# Patient Record
Sex: FEMALE | Race: WHITE | NOT HISPANIC OR LATINO | URBAN - METROPOLITAN AREA
[De-identification: names, ages, dates, MRNs, and addresses within clinical notes are randomized per-mention and may not be internally consistent; named-entity substitution may affect disease eponyms.]

---

## 2017-12-23 ENCOUNTER — EMERGENCY (EMERGENCY)
Facility: HOSPITAL | Age: 67
LOS: 1 days | Discharge: HOME | End: 2017-12-23
Attending: EMERGENCY MEDICINE | Admitting: EMERGENCY MEDICINE
Payer: COMMERCIAL

## 2017-12-23 VITALS
OXYGEN SATURATION: 99 % | SYSTOLIC BLOOD PRESSURE: 121 MMHG | HEART RATE: 65 BPM | DIASTOLIC BLOOD PRESSURE: 70 MMHG | RESPIRATION RATE: 18 BRPM

## 2017-12-23 DIAGNOSIS — T78.1XXA OTHER ADVERSE FOOD REACTIONS, NOT ELSEWHERE CLASSIFIED, INITIAL ENCOUNTER: ICD-10-CM
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
## 2017-12-23 DIAGNOSIS — Z88.2 ALLERGY STATUS TO SULFONAMIDES: ICD-10-CM
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
## 2017-12-23 PROCEDURE — 99283 EMERGENCY DEPT VISIT LOW MDM: CPT

## 2017-12-23 PROCEDURE — 99283 EMERGENCY DEPT VISIT LOW MDM: CPT | Mod: 25

## 2017-12-23 RX ORDER — DIPHENHYDRAMINE HCL 50 MG
25 CAPSULE ORAL ONCE
Qty: 0 | Refills: 0 | Status: COMPLETED | OUTPATIENT
Start: 2017-12-23 | End: 2017-12-23

## 2017-12-23 RX ORDER — DIPHENHYDRAMINE HCL 50 MG
1 CAPSULE ORAL
Qty: 9 | Refills: 0 | OUTPATIENT
Start: 2017-12-23 | End: 2017-12-25

## 2017-12-23 RX ORDER — DEXAMETHASONE 0.5 MG/5ML
8 ELIXIR ORAL ONCE
Qty: 0 | Refills: 0 | Status: COMPLETED | OUTPATIENT
Start: 2017-12-23 | End: 2017-12-23

## 2017-12-23 RX ADMIN — Medication 25 MILLIGRAM(S): at 06:16

## 2017-12-23 RX ADMIN — Medication 8 MILLIGRAM(S): at 06:16

## 2017-12-23 NOTE — ED PROVIDER NOTE - PROGRESS NOTE DETAILS
pt feeling better, rash improved, recommend PMD f/u  I have discussed the discharge plan with the patient. The patient agrees with the plan, as discussed.  The patient understands Emergency Department diagnosis is a preliminary diagnosis often based on limited information and that the patient must adhere to the follow-up plan as discussed.  The patient understands that if the symptoms worsen or if prescribed medications do not have the desired/planned effect that the patient may return to the Emergency Department at any time for further evaluation and treatment.

## 2017-12-23 NOTE — ED PROVIDER NOTE - ENMT, MLM
Airway patent, Nasal mucosa clear. Mouth with normal mucosa. Throat has no vesicles, no oropharyngeal exudates and uvula is midline. no stridor, no trismus, no oral/tongue swelling, no drooling

## 2017-12-23 NOTE — ED PROVIDER NOTE - MEDICAL DECISION MAKING DETAILS
itchy, red rash to body, no airway compromise, no wheezing, likely allergic reaction  -decadron, benadryl

## 2017-12-23 NOTE — ED ADULT TRIAGE NOTE - CHIEF COMPLAINT QUOTE
I think I am having an allergic reaction.  I don't know to what, but my face got very numb and my body got very warm and red.

## 2017-12-23 NOTE — ED PROVIDER NOTE - OBJECTIVE STATEMENT
67F no PMH c/o allergic reaction. pt states she woke up this morning, had coffee and ate usual protein bar for breakfast. states then she developed warmth, swelling, and tightness to face.  spread to body, redness, itching to extremities and torso.  no fevers. no vomiting. no intraoral swelling.  no sick contacts. no recent travel.  no new foods, no new meds, no new soaps/detergents/lotions.  pt states the coffee pot was new.

## 2019-08-28 ENCOUNTER — APPOINTMENT (OUTPATIENT)
Dept: OTOLARYNGOLOGY | Facility: CLINIC | Age: 69
End: 2019-08-28
Payer: COMMERCIAL

## 2019-08-28 VITALS
HEART RATE: 82 BPM | SYSTOLIC BLOOD PRESSURE: 124 MMHG | BODY MASS INDEX: 23.27 KG/M2 | HEIGHT: 63.5 IN | DIASTOLIC BLOOD PRESSURE: 78 MMHG | WEIGHT: 133 LBS

## 2019-08-28 DIAGNOSIS — Z87.891 PERSONAL HISTORY OF NICOTINE DEPENDENCE: ICD-10-CM

## 2019-08-28 DIAGNOSIS — R51 HEADACHE: ICD-10-CM

## 2019-08-28 DIAGNOSIS — Z80.9 FAMILY HISTORY OF MALIGNANT NEOPLASM, UNSPECIFIED: ICD-10-CM

## 2019-08-28 DIAGNOSIS — Z82.49 FAMILY HISTORY OF ISCHEMIC HEART DISEASE AND OTHER DISEASES OF THE CIRCULATORY SYSTEM: ICD-10-CM

## 2019-08-28 DIAGNOSIS — Z87.09 PERSONAL HISTORY OF OTHER DISEASES OF THE RESPIRATORY SYSTEM: ICD-10-CM

## 2019-08-28 PROBLEM — Z00.00 ENCOUNTER FOR PREVENTIVE HEALTH EXAMINATION: Status: ACTIVE | Noted: 2019-08-28

## 2019-08-28 PROCEDURE — 99213 OFFICE O/P EST LOW 20 MIN: CPT | Mod: 25

## 2019-08-28 PROCEDURE — 31231 NASAL ENDOSCOPY DX: CPT

## 2019-08-28 RX ORDER — ZALEPLON 10 MG/1
CAPSULE ORAL
Refills: 0 | Status: ACTIVE | COMMUNITY

## 2019-08-28 RX ORDER — FINASTERIDE 1 MG/1
TABLET ORAL
Refills: 0 | Status: ACTIVE | COMMUNITY

## 2019-08-28 NOTE — ASSESSMENT
[FreeTextEntry1] : She has a 1.5 week history of mid  facial pain, sinus pressure, and headaches. There was no obvious signs of a infection on nasal endoscopy. The maxillary sinuses were clear. She has had reflux exacerbation. We discussed possible etiologies for her symptoms such as migraines, neuralgias, and TMJ dysfunction.\par \par Plan\par -Findings and management options were discussed with the patient.\par -Nasal saline irrigations and decongestants/antihistamines as needed\par -I recommended that she stop using the topical decongestant spray as it can cause chronic changes, is addictive and causes rebound symptoms. She was asked to start a nasal steroid spray\par -I asked her to consider trying the medication for her migraines.\par -Continue treatment for reflux. It is possible that the PPI could be causing the headaches. She may try switching to Zantac.\par -Dental evaluation to rule out dental source\par -She can consider CT scan of the sinuses. Her sinuses were clear on MRI of the brain last year but we can obtain the scan to rule out sinus disease\par -Followup in one week or call to me know how she is doing

## 2019-08-28 NOTE — HISTORY OF PRESENT ILLNESS
[de-identified] : JAY MONTAGUE is a 68 year patient with a history of chronic rhinosinusitis, migraines, and reflux. She has had mid facial pain and sinus pressure for 1.5 weeks. Initially she had nausea, heartburn, and reflux related symptoms 3 weeks ago. She spoke with her gastroenterologist and started taking omeprazole twice a day. The heartburn improved but the nausea did not change. She was at a barbecue 1.5 weeks ago. She did have some wine. She developed sinus pressure, midfacial pain and headaches. The symptoms are constant. She feels that she cannot think. She also has nasal congestion. She did not have a cold. She tried Afrin, Mucinex D and Sudafed without improvement. She saw the neurologist. She was given prednisone but it did not help. She was given a migraine medicine but has not yet tried it. She has been using Mucinex decongestant nasal spray which does help. She is not using a nasal steroid spray. She said that she had allergy evaluation since her last visit which was negative. She had an MRI last year which showed no significant sinus disease. She is undergoing treatment with Invisalign. She denies a history of TMJ dysfunction

## 2019-11-18 ENCOUNTER — APPOINTMENT (OUTPATIENT)
Dept: OTOLARYNGOLOGY | Facility: CLINIC | Age: 69
End: 2019-11-18
Payer: COMMERCIAL

## 2019-11-18 VITALS
HEIGHT: 63.5 IN | WEIGHT: 133 LBS | SYSTOLIC BLOOD PRESSURE: 102 MMHG | HEART RATE: 70 BPM | DIASTOLIC BLOOD PRESSURE: 55 MMHG | BODY MASS INDEX: 23.27 KG/M2

## 2019-11-18 PROCEDURE — 92557 COMPREHENSIVE HEARING TEST: CPT

## 2019-11-18 PROCEDURE — 99214 OFFICE O/P EST MOD 30 MIN: CPT | Mod: 25

## 2019-11-18 PROCEDURE — 92567 TYMPANOMETRY: CPT

## 2019-11-18 NOTE — HISTORY OF PRESENT ILLNESS
[de-identified] : JAY MONTAGUE has a history of ear pain. Patient reports of ear pain greater in the right ear than the left ear. Stabbing brief pain. The patient is using dental braces (invisilgn).\par \par Patient reports of severe tinnitus. She reports of being very congested with a lot of sinus pressure. Patient reports of no perceived changes in hearing. No otorrhea reported at this visit.

## 2019-11-18 NOTE — ASSESSMENT
[FreeTextEntry1] : Ear hygiene reviewed in detail.  Follow up recommended if symptoms persist or progresses.  Routine follow up for cerumen management suggested.\par \par I have carefully reviewed the etiologies of tinnitus with the patient and have reviewed management options including coping strategies.  I have offered a referral to an audiologist for further evaluation and counseling.\par \par Recent pain appears to be referred from the TMJ joint. Further evaluation recommended if symptoms persist a progress

## 2019-11-18 NOTE — DATA REVIEWED
[de-identified] : Complete audiometry was ordered and completed today. This was separately reported by the audiologist. The results were reviewed in detail with the patient.\par \par

## 2020-02-24 ENCOUNTER — APPOINTMENT (OUTPATIENT)
Dept: OTOLARYNGOLOGY | Facility: CLINIC | Age: 70
End: 2020-02-24
Payer: COMMERCIAL

## 2020-02-24 VITALS
HEIGHT: 65 IN | SYSTOLIC BLOOD PRESSURE: 110 MMHG | HEART RATE: 70 BPM | WEIGHT: 133 LBS | BODY MASS INDEX: 22.16 KG/M2 | DIASTOLIC BLOOD PRESSURE: 78 MMHG

## 2020-02-24 DIAGNOSIS — Z87.09 PERSONAL HISTORY OF OTHER DISEASES OF THE RESPIRATORY SYSTEM: ICD-10-CM

## 2020-02-24 PROCEDURE — 99213 OFFICE O/P EST LOW 20 MIN: CPT | Mod: 25

## 2020-02-24 PROCEDURE — 31231 NASAL ENDOSCOPY DX: CPT

## 2020-02-24 RX ORDER — OMEPRAZOLE 40 MG/1
40 CAPSULE, DELAYED RELEASE ORAL
Refills: 0 | Status: DISCONTINUED | COMMUNITY
End: 2020-02-24

## 2020-02-24 RX ORDER — CROMOLYN SODIUM 40 MG/ML
4 SOLUTION/ DROPS OPHTHALMIC
Qty: 10 | Refills: 0 | Status: ACTIVE | COMMUNITY
Start: 2020-01-21

## 2020-02-24 RX ORDER — METHYLPREDNISOLONE 4 MG/1
4 TABLET ORAL
Refills: 0 | Status: DISCONTINUED | COMMUNITY
End: 2020-02-24

## 2020-02-24 NOTE — HISTORY OF PRESENT ILLNESS
[de-identified] : Patient reports one week history of rhinitis, clear thin occasionally blood-tinged mucus and maxillary pressure and operative pressure. She has been taking Sudafed, Advil, humidifier, and Afrin  With no improvement. Previous MRI in [address] showed no sinus opacification. She has been seen by Dr. Spurling and Dr. Ramesh within the past year for headaches and attributed to TMJ arthralgia in the setting of invisalign\par \par

## 2020-02-24 NOTE — ASSESSMENT
[FreeTextEntry1] : Facial pain and rhinitis. I see no evidence of acute rhinosinusitis. We did send a culture from the left middle meatus. Recommended retractor results and initiate antibiotics if clinically warranted. Also recommended saline and Sudafed/afrin.  Followup 2 weeks if worse

## 2020-04-06 LAB — BACTERIA FLD CULT: ABNORMAL

## 2020-10-06 ENCOUNTER — APPOINTMENT (OUTPATIENT)
Dept: OTOLARYNGOLOGY | Facility: CLINIC | Age: 70
End: 2020-10-06
Payer: COMMERCIAL

## 2020-10-06 VITALS — WEIGHT: 133 LBS | HEIGHT: 65 IN | BODY MASS INDEX: 22.16 KG/M2 | TEMPERATURE: 97 F

## 2020-10-06 PROCEDURE — 99213 OFFICE O/P EST LOW 20 MIN: CPT | Mod: 25

## 2020-10-06 PROCEDURE — 31231 NASAL ENDOSCOPY DX: CPT

## 2020-10-06 RX ORDER — AZITHROMYCIN 250 MG/1
250 TABLET, FILM COATED ORAL
Qty: 6 | Refills: 0 | Status: COMPLETED | COMMUNITY
Start: 2019-12-24 | End: 2020-10-06

## 2020-10-06 NOTE — HISTORY OF PRESENT ILLNESS
[de-identified] : JAY MONTAGUE is here for a possible sinus infection. She has a history of chronic rhinitis, recurrent sinusitis, migraines, and reflux. She developed bilateral maxillary sinus pain about 1.5 weeks ago. She has also had nasal congestion and obstruction. She has no fever, cough, or loss of smell. She denies known COVID exposure. She uses a Neti pot, Flonase, and Sudafed. Afrin does help and she is used it a few times. She does use Invisalgn and may have TMJ dysfunction and pain from that. She also suffers from tinnitus. That has been better lately. She saw Dr. Jimenez in February for possible sinus infection. She does not think that she required antibiotics at the time., MRI in the past showed no significant sinus disease.

## 2020-10-06 NOTE — ASSESSMENT
[FreeTextEntry1] : She has had midfacial pain and nasal congestion for the past 1.5 weeks. There was a little stringy mucus on nasal endoscopy. A culture was taken. We discussed the possible causes such as sinus infection, allergies, migraines, and TMJ dysfunction. she said that she tested negative for allergies in the past\par \par Plan\par -Findings and management options were discussed with the patient.\par -Nasal saline irrigations and decongestants or antihistamines as needed\par -I asked her to only use Afrin for approximately 3 days if she needs to\par -flonase\par -trial of atrovent\par -continue management of migraines, reflux and possible TMJD\par -I will see how she is feeling when she calls on Friday. We'll discuss possibly treating her with antibiotics and steroids even if the culture is negative (if she is still symptomatic)  She will be flying next week. I recommended a decongestant and Afrin before the flight\par -I will see her back if her symptoms worsen or do not resolve in the next couple of weeks.  We had discussed obtaining a sinus CT scan in the past. I will speak with her about it again if the symptoms persist

## 2020-10-12 LAB — BACTERIA FLD CULT: NORMAL

## 2020-12-23 PROBLEM — Z87.09 HISTORY OF UPPER RESPIRATORY INFECTION: Status: RESOLVED | Noted: 2020-02-24 | Resolved: 2020-12-23

## 2021-08-11 ENCOUNTER — NON-APPOINTMENT (OUTPATIENT)
Age: 71
End: 2021-08-11

## 2021-08-12 ENCOUNTER — APPOINTMENT (OUTPATIENT)
Dept: ORTHOPEDIC SURGERY | Facility: CLINIC | Age: 71
End: 2021-08-12
Payer: COMMERCIAL

## 2021-08-12 ENCOUNTER — NON-APPOINTMENT (OUTPATIENT)
Age: 71
End: 2021-08-12

## 2021-08-12 VITALS
BODY MASS INDEX: 22.86 KG/M2 | WEIGHT: 129 LBS | DIASTOLIC BLOOD PRESSURE: 69 MMHG | HEIGHT: 63 IN | SYSTOLIC BLOOD PRESSURE: 112 MMHG | HEART RATE: 77 BPM

## 2021-08-12 PROCEDURE — 99204 OFFICE O/P NEW MOD 45 MIN: CPT

## 2021-08-12 PROCEDURE — 72100 X-RAY EXAM L-S SPINE 2/3 VWS: CPT

## 2021-08-31 ENCOUNTER — APPOINTMENT (OUTPATIENT)
Dept: OTOLARYNGOLOGY | Facility: CLINIC | Age: 71
End: 2021-08-31
Payer: COMMERCIAL

## 2021-08-31 DIAGNOSIS — H92.02 OTALGIA, LEFT EAR: ICD-10-CM

## 2021-08-31 PROCEDURE — 69210 REMOVE IMPACTED EAR WAX UNI: CPT

## 2021-08-31 PROCEDURE — 99212 OFFICE O/P EST SF 10 MIN: CPT | Mod: 25

## 2021-08-31 NOTE — ASSESSMENT
[FreeTextEntry1] : She has had left ear pain for the past couple of days. Cerumen was removed. The ear was otherwise normal. I discussed possible etiologies for ear pain.  We had discussed the possibility it could be related to TMJ dysfunction from the Invisalign. \par \par Plan\par -Findings and management options were discussed with the patient.\par -PLAN\par \par -findings and management options discussed in detail with the patient. \par -good aural hygiene\par -avoid using cotton swabs in the ears\par -wax removal drops as needed. \par -annual audiogram- she was unable to stay for one day\par -follow up if the ear pain persists or she has any other problems.  \par

## 2021-08-31 NOTE — HISTORY OF PRESENT ILLNESS
[de-identified] : JAY MONTAGUE is a 70 year patient With a history of bilateral hearing loss and recurrent cerumen impaction. She has had  pain in the left ear for 2-3 days. She has no otorrhea or dizziness. She has tinnitus but there has been no change. She has no nasal or throat symptoms. She does use Invisalign. That maybe altering her bite. We had discussed possible TMJD in the past\par \par ENT history\par MRI in the past showed no significant sinus disease

## 2021-09-23 ENCOUNTER — APPOINTMENT (OUTPATIENT)
Dept: ORTHOPEDIC SURGERY | Facility: CLINIC | Age: 71
End: 2021-09-23
Payer: COMMERCIAL

## 2021-09-23 DIAGNOSIS — M51.36 OTHER INTERVERTEBRAL DISC DEGENERATION, LUMBAR REGION: ICD-10-CM

## 2021-09-23 DIAGNOSIS — M54.16 RADICULOPATHY, LUMBAR REGION: ICD-10-CM

## 2021-09-23 PROCEDURE — 99213 OFFICE O/P EST LOW 20 MIN: CPT | Mod: 95

## 2021-09-23 NOTE — PHYSICAL EXAM
[Normal] : Gait: normal [de-identified] : 5 out of 5 motor strength, sensation is intact and symmetrical full range of motion flexion extension and rotation, no palpatory tenderness full range of motion of hips knees shoulders and elbows (all four extremities), no atrophy, negative straight leg raise, no pathological reflexes, no swelling, normal ambulation, no apparent distress skin is intact, no palpable lymph nodes, no upper or lower extremity instability, alert and oriented x3 and normal mood. Normal finger-to nose test. Incision healed. Numbness right S1. Pain over right troch area. [de-identified] : MRI Lumbar 9/21/21 ([facility]) - mild neural compression (report)-S/P Mild dextroscoliosis of lumbar.

## 2021-09-23 NOTE — DISCUSSION/SUMMARY
[de-identified] : Right  S1 radiculopathy.\par Mild right hip arthritis.\par L4 mild right neural compression.\par Discussed all options.\par PT, and NSAIDs.\par Medrol before NSAIDs.\par Discussed injection right trochanteric region. \par All questions were answered, all alternatives discussed and the patient is in complete agreement with that plan. Follow-up appointment as instructed. Any issues and the patient will call or come in sooner.

## 2021-09-23 NOTE — REASON FOR VISIT
[Home] : at home, [unfilled] , at the time of the visit. [Medical Office: ([facility])___] : at the medical office located in  [Verbal consent obtained from patient] : the patient, [unfilled] [Follow-Up Visit] : a follow-up visit for

## 2021-09-23 NOTE — HISTORY OF PRESENT ILLNESS
[Worsening] : worsening [de-identified] : 70 year old female referred by Dr. Emily Petersen presenting for follow up evaluation of intermittent lower back pain over the years. \par S/P lumbar surgery in 2003 at [facility]. \par Went for lumbar MRI. \par Medrol no help.\par Has complaints of R buttock pain that radiates to the groin to the anterior thigh.\par States to have seen Neurosurgeon previously. (Dr. Winter Bruno) Surgery.\par Sees Chiropractic care Dr. Emily Petersen.\par Complains of numbness radiating down the R leg to the 4th and 5th toes, which she has had intermittently over the past few years. States that numbness is more bothersome than the pain. \eugenia Takes advil and has intermittent relief. \par Has been participating with chiropractic care with Dr. Petersen over the years which she states helps. \par Denies epidurals. \eugenia Presents today for MRI Lumbar review. \par No fever chills sweats nausea vomiting no bowel or bladder dysfunction, no recent weight loss or gain no night pain. This history is in addition to the intake form that I personally reviewed. \par

## 2022-01-10 ENCOUNTER — APPOINTMENT (OUTPATIENT)
Dept: OTOLARYNGOLOGY | Facility: CLINIC | Age: 72
End: 2022-01-10
Payer: COMMERCIAL

## 2022-01-10 PROCEDURE — 92557 COMPREHENSIVE HEARING TEST: CPT

## 2022-01-10 PROCEDURE — 92567 TYMPANOMETRY: CPT

## 2022-01-10 PROCEDURE — 99213 OFFICE O/P EST LOW 20 MIN: CPT | Mod: 25

## 2022-01-10 RX ORDER — EVOLOCUMAB 140 MG/ML
140 INJECTION, SOLUTION SUBCUTANEOUS
Qty: 2 | Refills: 0 | Status: ACTIVE | COMMUNITY
Start: 2022-01-05

## 2022-01-10 NOTE — ASSESSMENT
[FreeTextEntry1] : She has had left-sided tinnitus for the past couple of weeks. It is intermittently worse. Her ear exam was normal. Audiogram showed a bilateral sensorineural hearing loss with mild asymmetry left ear which is stable. I discussed possible causes of exacerbation of the tinnitus. \par \par PLAN\par \par - findings and management options discussed with the patient. \par - Good aural hygiene.\par - noise precautions\par - repeat audiogram in one year\par - literature regarding tinnitus given\par - Avoid substances that can make tinnitus worse.  \par - She may try short burst of steroids. She will consider it \par - She may consider tinnitus  therapy \par - They may use a white noise maker or leave the tv/radio on when it is quiet \par - Other treatment options such as Elavil, tinnitus maskers, hearing aids were discussed.\par - follow up or call me in the next couple weeks if the tinnitus does not improve. \par - call and return earlier if any concerns

## 2022-01-10 NOTE — HISTORY OF PRESENT ILLNESS
[de-identified] : JAY MONTAGUE is a 71 year patient With a history of bilateral sensorineural hearing loss here for tinnitus.  She has had left-sided tinnitus for the past 2 weeks. It intermittently worsens. She has a highed pitch noise. She noticed it after she was seeing her chiropractor and was having her back manipulated. She did not have acoustic trauma. She has no otalgia or otorrhea. She does have mild nasal congestion and postnasal drip which is unchanged. She uses a nasal steroid spray. She did get her permanent retainers after using Invisalign. she feels that they are tight but she does not have ear pain. She does have some dental discomfort. The tinnitus was worse on Friday night. She tried a homeopathic medication Cortisolv which she thinks may have helped. It is a little bit worse today.\par \par ENT History \par She has a history of mild sensorineural hearing loss with a mild asymmetry in the left ear. \par She had an MRI in the past \par she may have TMJ dysfunction

## 2022-07-27 ENCOUNTER — APPOINTMENT (OUTPATIENT)
Dept: OTOLARYNGOLOGY | Facility: CLINIC | Age: 72
End: 2022-07-27

## 2022-07-27 VITALS — BODY MASS INDEX: 22.86 KG/M2 | HEIGHT: 63 IN | WEIGHT: 129 LBS

## 2022-07-27 PROCEDURE — 99213 OFFICE O/P EST LOW 20 MIN: CPT | Mod: 25

## 2022-07-27 PROCEDURE — 69210 REMOVE IMPACTED EAR WAX UNI: CPT

## 2022-07-27 NOTE — HISTORY OF PRESENT ILLNESS
[de-identified] : JAY MONTAGUE is a 71 year old patient who is well-known to me.  She is here to check for cerumen impaction.  She has had a little bit worsening of the tinnitus which is still worse on the left side.  She has no otalgia or otorrhea.  She has been seeing a chiropractor for TMJ dysfunction.  She has new retainers.  The ear pain has resolved.  She also has a history of a chronic postnasal drip and chronic rhinitis.  She uses an antihistamine with decongestant.\par \par ENT History \par She has a history of mild sensorineural hearing loss with a mild asymmetry in the left ear. \par She had an MRI in the past \par She has TMJ dysfunction.  She uses retainers following Invisalign treatment

## 2022-07-27 NOTE — ASSESSMENT
[FreeTextEntry1] : She has been doing well since her last visit.  She had cerumen impaction which was removed today.\par \par PLAN\par \par - findings and management options discussed with the patient. \par - continue good aural hygiene.\par -Annual audiogram\par -Continue treatment for TMJ dysfunction\par -Follow-up in a few months to check for cerumen if she is doing well. \par - call and return earlier if any concerns

## 2022-08-01 ENCOUNTER — APPOINTMENT (OUTPATIENT)
Dept: OTOLARYNGOLOGY | Facility: CLINIC | Age: 72
End: 2022-08-01

## 2022-09-23 ENCOUNTER — APPOINTMENT (OUTPATIENT)
Dept: OTOLARYNGOLOGY | Facility: CLINIC | Age: 72
End: 2022-09-23

## 2022-09-23 VITALS — BODY MASS INDEX: 23.21 KG/M2 | HEIGHT: 63 IN | TEMPERATURE: 94 F | WEIGHT: 131 LBS

## 2022-09-23 PROCEDURE — 99213 OFFICE O/P EST LOW 20 MIN: CPT | Mod: 25

## 2022-09-23 PROCEDURE — 31231 NASAL ENDOSCOPY DX: CPT

## 2022-09-23 NOTE — ASSESSMENT
[FreeTextEntry1] : She has had nasal congestion and sinus pain/pressure.  On exam, there is no obvious sinus infection.  Her pain is mainly over the maxillary sinuses and the sinuses were clear.  Both sinuses have been extensively also notable directly visualize the sinus the pain may be due to her TMJ dysfunction.  She also may have migraines.  The nasal congestion may be due to allergies.  She has a history of reflux but has not had many symptoms\par \par Plan\par -Findings and management options were discussed with the patient.\par -Nasal saline irrigations and antihistamine plus minus decongestant as needed\par -I recommend nasal steroid spray\par -She may use Breathe Right strips\par -Management of TMJ dysfunction recommended\par -She may try a burst of steroids.  She has taken prednisone in the past.  I would hold off on antibiotics as there is no obvious sinus infection on nasal endoscopy\par -Consider CT scan of the sinuses if her symptoms persist\par -I have asked her to call me next week and let me know how she is feeling.  If she is not feeling better, we will discuss further management

## 2022-09-23 NOTE — HISTORY OF PRESENT ILLNESS
[de-identified] : JAY MONTAGUE is a 71 year old patient here for acute history of nasal congestion, nasal obstruction, and facial pain over the maxillary sinuses.  She flew recently.  She did not notice any worsening of the symptoms in the flight.  She used Afrin for the past several days.  The facial pain is somewhat better today.  She is not using a nasal steroid spray.  She is an antihistamine and decongestant as needed.  She does have TMJ dysfunction.  She sees a chiropractor for it\par  \par ENT History \par She has a history of mild sensorineural hearing loss with a mild asymmetry in the left ear. \par She had an MRI in the past.  No IAC masses.  Sinuses reportedly clear\par She has TMJ dysfunction. She uses retainers following Invisalign treatment\par she has seen a neurologist in the past

## 2023-03-30 ENCOUNTER — APPOINTMENT (OUTPATIENT)
Dept: OTOLARYNGOLOGY | Facility: CLINIC | Age: 73
End: 2023-03-30
Payer: COMMERCIAL

## 2023-03-30 VITALS — BODY MASS INDEX: 23.1 KG/M2 | HEIGHT: 63.5 IN | WEIGHT: 132 LBS

## 2023-03-30 DIAGNOSIS — K21.9 GASTRO-ESOPHAGEAL REFLUX DISEASE W/OUT ESOPHAGITIS: ICD-10-CM

## 2023-03-30 DIAGNOSIS — H90.3 SENSORINEURAL HEARING LOSS, BILATERAL: ICD-10-CM

## 2023-03-30 DIAGNOSIS — H93.13 TINNITUS, BILATERAL: ICD-10-CM

## 2023-03-30 PROCEDURE — 92504 EAR MICROSCOPY EXAMINATION: CPT

## 2023-03-30 PROCEDURE — 92557 COMPREHENSIVE HEARING TEST: CPT

## 2023-03-30 PROCEDURE — 92567 TYMPANOMETRY: CPT

## 2023-03-30 PROCEDURE — 99213 OFFICE O/P EST LOW 20 MIN: CPT | Mod: 25

## 2023-03-30 RX ORDER — METHYLPREDNISOLONE 4 MG/1
4 TABLET ORAL
Qty: 1 | Refills: 1 | Status: COMPLETED | COMMUNITY
Start: 2021-08-12 | End: 2023-03-30

## 2023-03-30 RX ORDER — BENZONATATE 100 MG/1
100 CAPSULE ORAL
Qty: 21 | Refills: 0 | Status: COMPLETED | COMMUNITY
Start: 2019-12-24 | End: 2023-03-30

## 2023-03-30 RX ORDER — ATORVASTATIN CALCIUM 80 MG/1
TABLET, FILM COATED ORAL
Refills: 0 | Status: COMPLETED | COMMUNITY
End: 2023-03-30

## 2023-03-30 RX ORDER — IPRATROPIUM BROMIDE 21 UG/1
0.03 SPRAY NASAL
Qty: 1 | Refills: 3 | Status: COMPLETED | COMMUNITY
Start: 2020-10-06 | End: 2023-03-30

## 2023-03-30 NOTE — HISTORY OF PRESENT ILLNESS
[de-identified] : JAY MONTAGUE is a 72 year old patient here for hearing loss and bilateral tinnitus.  She has no otalgia or otorrhea.  Her nasal symptoms remain unchanged.  She has nasal congestion and obstruction.  She is not having sinus pain at this time.  She said that she had COVID and saw another ENT afterwards for evaluation.  She has had allergy evaluation was negative.  She saw a neurologist and was diagnosed and treated for migraines.  She said she had an MRI done as well.\par \par ENT History \par She has a history of mild sensorineural hearing loss with a mild asymmetry in the left ear. \par She had an MRI in the past. No IAC masses. Sinuses reportedly clear\par She has TMJ dysfunction. She uses retainers following Invisalign treatment\par she has seen a neurologist in the past and had recent evaluation.  She had an imaging study

## 2023-03-30 NOTE — ASSESSMENT
[FreeTextEntry1] : She has bilateral sensorineural hearing loss and tinnitus.  She had small mount of wax which was removed.  Audiogram showed a bilateral sensorineural hearing loss.  We reviewed the results\par \par She is a history of chronic rhinitis.\par \par He has facial and sinus pain likely due to migraines and TMJ dysfunction\par \par Plan\par -Findings and management options were discussed with the patient.\par -Good ear hygiene reviewed\par -Monitor hearing\par -Hearing aid evaluation recommended.  This will help with both the hearing loss and tinnitus.\par -I also reviewed management of tinnitus.  \par -Continue treatment for TMJ dysfunction\par -Continue treatment for migraines\par -Nasal steroid spray, nasal saline irrigations, and antihistamine as needed\par -We will see her back if she has any worsening symptoms or concerns.  Otherwise I will see her back in a few months for her regular checkup

## 2023-07-28 ENCOUNTER — APPOINTMENT (OUTPATIENT)
Dept: OTOLARYNGOLOGY | Facility: CLINIC | Age: 73
End: 2023-07-28
Payer: COMMERCIAL

## 2023-07-28 VITALS — WEIGHT: 131 LBS | HEIGHT: 63.5 IN | BODY MASS INDEX: 22.92 KG/M2

## 2023-07-28 DIAGNOSIS — H93.12 TINNITUS, LEFT EAR: ICD-10-CM

## 2023-07-28 DIAGNOSIS — H61.23 IMPACTED CERUMEN, BILATERAL: ICD-10-CM

## 2023-07-28 PROCEDURE — 99213 OFFICE O/P EST LOW 20 MIN: CPT

## 2023-07-28 NOTE — HISTORY OF PRESENT ILLNESS
[de-identified] : JAY MONTAGUE is a 72 year old patient here for worsening left-sided tinnitus for the past few weeks.  She also has had nasal congestion.  There is no change in her hearing.  She is wondering if she could have wax buildup.  She did see an audiologist for hearing aid evaluation.  She is considering it.  She is using Flonase.\par \par ENT History \par She has a history of mild sensorineural hearing loss with a mild asymmetry in the left ear. \par She had an MRI in the past. No IAC masses. Sinuses reportedly clear, she had an MRI in December 2022\par She has TMJ dysfunction. She uses retainers following Invisalign treatment\par she has seen a neurologist in the past and had recent evaluation.  She has medication for migraines\par Allergy evaluation was reportedly negative\par

## 2023-07-28 NOTE — ASSESSMENT
[FreeTextEntry1] : She has bilateral sensorineural hearing loss and tinnitus.  Tinnitus in the left ear has been worse.  She had a small amount of wax which was removed.\par \par She is a history of chronic rhinitis, TMJ dysfunction, and migraines.\par \par Plan\par -Findings and management options were discussed with the patient.\par -Continue good ear hygiene\par -Monitor hearing.  If there is any concern for a change in her hearing, I recommend repeat hearing test\par -She will follow-up with the audiologist for hearing aid evaluation\par -Continue treatment for TMJ dysfunction and migraines\par -Nasal steroid spray, nasal saline irrigations, and antihistamine as needed.  She may try Breathe Right strips\par -She declined nasal endoscopy and flexible laryngoscopy\par -We will see her back if she has any worsening symptoms or concerns.  Otherwise I will see her back in a few months for her regular checkup

## 2024-01-30 ENCOUNTER — APPOINTMENT (OUTPATIENT)
Dept: OTOLARYNGOLOGY | Facility: CLINIC | Age: 74
End: 2024-01-30
Payer: COMMERCIAL

## 2024-01-30 DIAGNOSIS — R44.8 OTHER SYMPTOMS AND SIGNS INVOLVING GENERAL SENSATIONS AND PERCEPTIONS: ICD-10-CM

## 2024-01-30 DIAGNOSIS — J32.8 OTHER CHRONIC SINUSITIS: ICD-10-CM

## 2024-01-30 DIAGNOSIS — J31.0 CHRONIC RHINITIS: ICD-10-CM

## 2024-01-30 DIAGNOSIS — M26.609 UNSPECIFIED TEMPOROMANDIBULAR JOINT DISORDER: ICD-10-CM

## 2024-01-30 PROCEDURE — 99214 OFFICE O/P EST MOD 30 MIN: CPT | Mod: 25

## 2024-01-30 PROCEDURE — 31231 NASAL ENDOSCOPY DX: CPT

## 2024-01-30 RX ORDER — SOFT LENS RINSE,STORE SOLUTION
0.9 SOLUTION, NON-ORAL MISCELLANEOUS
Refills: 0 | Status: ACTIVE | COMMUNITY

## 2024-01-30 RX ORDER — FLUTICASONE PROPIONATE 50 MCG
SPRAY, SUSPENSION NASAL
Refills: 0 | Status: ACTIVE | COMMUNITY

## 2024-01-30 RX ORDER — AMOXICILLIN AND CLAVULANATE POTASSIUM 875; 125 MG/1; MG/1
875-125 TABLET, COATED ORAL
Qty: 20 | Refills: 0 | Status: ACTIVE | COMMUNITY
Start: 2024-01-30 | End: 1900-01-01

## 2024-01-30 RX ORDER — OXYMETAZOLINE HCL 0.05 %
0.05 SPRAY, NON-AEROSOL (ML) NASAL
Refills: 0 | Status: ACTIVE | COMMUNITY

## 2024-01-30 NOTE — HISTORY OF PRESENT ILLNESS
[de-identified] : JAY MONTAGUE is a 73 year old patient Here for a possible sinus infection.  She has a long history of chronic nasal congestion.  She was sick in December.  She said testing for everything was negative.  She was given a Z-Alberto.  She has tried nasal steroid spray without improvement.  She has had some bleeding from the nose recently.  She is using a humidifier now.  She has heaviness in her head.  She has used Afrin as needed.  She did take her migraine medication but it did not help with the heaviness in her head.  She is concerned she may have an infection  ENT History  She has a history of mild sensorineural hearing loss with a mild asymmetry in the left ear.  She had an MRI in the past. No IAC masses. Sinuses reportedly clear, she had an MRI in December 2022  She has TMJ dysfunction. She uses retainers following Invisalign treatment  she has seen a neurologist in the past and had recent evaluation. She has medication for migraines  Allergy evaluation was reportedly negative

## 2024-01-30 NOTE — PHYSICAL EXAM
[TextEntry] : General: The patient was alert, oriented and in no distress. Voice was clear.  Face: The patient had no facial asymmetry or mass. The skin was unremarkable.  Ears: Hearing normal to conversational voice External ears were normal without deformity. Ear canals: scant cerumen. no inflammation Tympanic membrane- intact with no inflammation, perforation or effusion  Nose: The external nose had no significant deformity.     On anterior rhinoscopy, the nasal mucosa was clear.  The anterior septum was grossly midline. There were no visualized polyps, purulence  or masses. Narrow nasal valves  Oral cavity: Oral mucosa- normal. Oral and base of tongue- clear and without mass. Gingival and buccal mucosa- moist and without lesions. Palate- the palate moved well. There was no cleft palate. There appeared to be good salivary flow.  Oral cavity/oropharynx- no pus, erythema or mass  Neck: The neck was symmetrical. The parotid and submandibular glands were normal without masses. The trachea was midline and there was no unusual crepitus. Thyroid was smooth and nontender and no masses were palpated. No masses  Lymphatics: Cervical adenopathy- none.

## 2024-02-05 LAB — EAR NOSE AND THROAT CULTURE: NORMAL

## 2024-07-30 ENCOUNTER — APPOINTMENT (OUTPATIENT)
Dept: OTOLARYNGOLOGY | Facility: CLINIC | Age: 74
End: 2024-07-30
Payer: COMMERCIAL

## 2024-07-30 VITALS — WEIGHT: 135 LBS | HEIGHT: 63.5 IN | BODY MASS INDEX: 23.62 KG/M2

## 2024-07-30 DIAGNOSIS — K21.9 GASTRO-ESOPHAGEAL REFLUX DISEASE W/OUT ESOPHAGITIS: ICD-10-CM

## 2024-07-30 DIAGNOSIS — J32.8 OTHER CHRONIC SINUSITIS: ICD-10-CM

## 2024-07-30 DIAGNOSIS — R44.8 OTHER SYMPTOMS AND SIGNS INVOLVING GENERAL SENSATIONS AND PERCEPTIONS: ICD-10-CM

## 2024-07-30 DIAGNOSIS — M26.609 UNSPECIFIED TEMPOROMANDIBULAR JOINT DISORDER: ICD-10-CM

## 2024-07-30 DIAGNOSIS — J31.0 CHRONIC RHINITIS: ICD-10-CM

## 2024-07-30 PROCEDURE — 31231 NASAL ENDOSCOPY DX: CPT

## 2024-07-30 PROCEDURE — 99214 OFFICE O/P EST MOD 30 MIN: CPT | Mod: 25

## 2024-07-30 RX ORDER — MOMETASONE FUROATE 100 %
POWDER (GRAM) MISCELLANEOUS
Qty: 180 | Refills: 3 | Status: ACTIVE | COMMUNITY
Start: 2024-07-30 | End: 1900-01-01

## 2024-07-30 RX ORDER — FAMOTIDINE 20 MG/1
20 TABLET, FILM COATED ORAL
Qty: 60 | Refills: 3 | Status: ACTIVE | COMMUNITY
Start: 2024-07-30 | End: 1900-01-01

## 2024-07-30 RX ORDER — PREDNISONE 10 MG/1
10 TABLET ORAL
Qty: 12 | Refills: 0 | Status: ACTIVE | COMMUNITY
Start: 2024-07-30 | End: 1900-01-01

## 2024-07-30 NOTE — ASSESSMENT
[FreeTextEntry1] : She has a history of chronic rhinitis, headaches/head pressure, TMJ dysfunction, and reflux.  She has had significant symptoms recently.  On exam, there was no evidence of a sinus infection.  She does have a mildly deviated septum, inferior turbinate hypertrophy, and nasal valve collapse.  She did try nasal spray with multiple antibiotics in it.  It may have caused more irritation.  I think her symptoms may be related to TMJ dysfunction, musculoskeletal pain, and/or migraines.  Plan -Findings and management options were discussed with the patient. - I am starting her on nasal saline irrigations with mometasone - She may try using the spray given to her by her other doctor twice a day if it does not cause irritation.  If not, I can add a topical antibiotic to the nasal saline irrigations - Moisturizing nasal gel as needed for dryness and irritation - She may consider ID evaluation for the culture results of the nasal culture by her other doctor - Breathe Right strips - I have asked her to consider repeat CT scan of the sinuses while she is symptomatic.  She does not wish to have one at this time.  Advised her to obtain the results from her last scan - Continue treatment of TMJ dysfunction. - I recommended physiatry evaluation to see if she would benefit from physical therapy and/for Botox for TMJ dysfunction - She is going to try taking her migraine medication - She does wish to take a course of oral steroids.  She was asked to take medication with food, take Pepcid, and avoid drinking alcohol and exercising. - Treatment of reflux recommended.  I recommended that she take Pepcid twice daily - Follow-up in approximately 3 weeks - She should call and return earlier if any problems or worsening symptoms

## 2024-07-30 NOTE — HISTORY OF PRESENT ILLNESS
[de-identified] : JAY MONTAGUE is a 73 year old patient With a history of chronic rhinitis, migraines, and TMJ dysfunction.  She is here for recurrent facial/sinus pressure, heaviness feeling to her head, ear pressure, and nasal congestion.  She said that the nasal saline rinses and Flonase have not helped with the congestion.  She has been using a decongestant spray for a few days.  She went to a center where [facility].  They did a nasal culture which showed coagulase-negative staph and Acinetobacter ursinsii.  She was given a nasal spray with mupirocin, gentamicin, Cipro, and fluconazole.  She had to stop it because it made her feel worse.  Breathe Right strips have helped with some of the nasal obstruction.  She has reflux but is not currently on medication.  She has been more stressed.  She saw an ENT last year and had allergy evaluation as well as a CT scan of the sinuses.  Neurology evaluation was recommended at that point.  ENT History  She has a history of mild sensorineural hearing loss with a mild asymmetry in the left ear.  She had an MRI in the past. No IAC masses. Sinuses reportedly clear, she had an MRI in December 2022  She has TMJ dysfunction. She uses retainers following Invisalign treatment  she has seen a neurologist in the past and had recent evaluation. She has medication for migraines  Allergy evaluation was reportedly negative

## 2024-07-30 NOTE — PHYSICAL EXAM
[TextEntry] : General: The patient was alert, oriented and in no distress. Voice was clear.  Face: The patient had no facial asymmetry or mass. The skin was unremarkable.  Ears: Hearing normal to conversational voice External ears were normal without deformity. Ear canals: scant cerumen. no inflammation Tympanic membrane- intact with no inflammation, perforation or effusion  Nose: The external nose had no significant deformity. On anterior rhinoscopy, the nasal mucosa was clear. The anterior septum was grossly midline. There were no visualized polyps, purulence or masses. Narrow nasal valves  Oral cavity: Oral mucosa- normal. Oral and base of tongue- clear and without mass. Gingival and buccal mucosa- moist and without lesions. Palate- the palate moved well. There was no cleft palate. There appeared to be good salivary flow. Oral cavity/oropharynx- no pus, erythema or mass  Neck: The neck was symmetrical. The parotid and submandibular glands were normal without masses. The trachea was midline and there was no unusual crepitus. Thyroid was smooth and nontender and no masses were palpated. No masses  Lymphatics: Cervical adenopathy- none.     PROCEDURE: NASAL ENDOSCOPY Surgeon: Dr. Ramesh Indication:   facial pain/pressure, assess for sinus infection, inadequate exam on anterior rhinoscopy Anesthetic: Topical lidocaine and Afrin Procedure: The patient was placed in a sitting position. Following application of the topical anesthetic and decongestant, exam was performed with a flexible telescope. The scope was passed along the right nasal floor to the nasopharynx. It was then passed into the region of the middle meatus, middle turbinate, and sphenoethmoid region. An identical procedure was performed on the left side. The following findings were noted:  Nasal mucosa: Mild edema Septum: mildly deviated Narrow nasal passages  Right nasal cavity Inferior turbinate: Hypertrophic Middle turbinate: normal Superior turbinate: normal Inferior meatus: no pus, polyps or congestion Middle meatus: no pus, polyps or congestion. Superior meatus: no pus, polyps, or congestion Sphenoethmoidal recess: no pus, polyps or congestion  Left nasal cavity Inferior turbinate: Mildly enlarged Middle turbinate: normal Superior turbinate: normal Inferior meatus: no pus, polyps or congestion Middle meatus: no pus, polyps, or congestion. Superior meatus: no pus, polyps, or congestion Sphenoethmoidal recess: no pus, polyps or congestion  Nasopharynx: no masses, choanae patent, no adenoid tissue  she could not tolerate laryngeal evaluation

## 2024-08-20 ENCOUNTER — APPOINTMENT (OUTPATIENT)
Dept: PHYSICAL MEDICINE AND REHAB | Facility: CLINIC | Age: 74
End: 2024-08-20

## 2024-08-20 ENCOUNTER — APPOINTMENT (OUTPATIENT)
Dept: OTOLARYNGOLOGY | Facility: CLINIC | Age: 74
End: 2024-08-20
Payer: COMMERCIAL

## 2024-08-20 DIAGNOSIS — M95.0 ACQUIRED DEFORMITY OF NOSE: ICD-10-CM

## 2024-08-20 DIAGNOSIS — R44.8 OTHER SYMPTOMS AND SIGNS INVOLVING GENERAL SENSATIONS AND PERCEPTIONS: ICD-10-CM

## 2024-08-20 DIAGNOSIS — H61.23 IMPACTED CERUMEN, BILATERAL: ICD-10-CM

## 2024-08-20 DIAGNOSIS — J32.8 OTHER CHRONIC SINUSITIS: ICD-10-CM

## 2024-08-20 DIAGNOSIS — K21.9 GASTRO-ESOPHAGEAL REFLUX DISEASE W/OUT ESOPHAGITIS: ICD-10-CM

## 2024-08-20 DIAGNOSIS — M26.609 UNSPECIFIED TEMPOROMANDIBULAR JOINT DISORDER: ICD-10-CM

## 2024-08-20 DIAGNOSIS — J31.0 CHRONIC RHINITIS: ICD-10-CM

## 2024-08-20 PROCEDURE — 99213 OFFICE O/P EST LOW 20 MIN: CPT | Mod: 25

## 2024-08-20 NOTE — PHYSICAL EXAM
[TextEntry] : General: The patient was alert, oriented and in no distress. Voice was clear.  Face: The patient had no facial asymmetry or mass. The skin was unremarkable.  Ears: Hearing normal to conversational voice External ears were normal without deformity. Ear canals: scant cerumen. no inflammation Tympanic membrane- intact with no inflammation, perforation or effusion  Nose: The external nose had no significant deformity. On anterior rhinoscopy, the nasal mucosa was a little dry.  The anterior septum was grossly midline. There were no visualized polyps, purulence or masses. Narrow nasal valves  Oral cavity: Oral mucosa- normal. Oral and base of tongue- clear and without mass. Gingival and buccal mucosa- moist and without lesions. Palate- the palate moved well. There was no cleft palate. There appeared to be good salivary flow. Oral cavity/oropharynx- no pus, erythema or mass  Neck: The neck was symmetrical. The parotid and submandibular glands were normal without masses. The trachea was midline and there was no unusual crepitus. Thyroid was smooth and nontender and no masses were palpated. No masses  Lymphatics: Cervical adenopathy- none.

## 2024-08-20 NOTE — ASSESSMENT
[FreeTextEntry1] : She continues to have sinus pressure.  There is no obvious sinus infection on nasal endoscopy at our last visit.  She did take oral steroids which helped.  We have discussed other possible etiologies of sinus pressure including migraines and TMJ dysfunction.  She also has a history of chronic rhinitis with nasal congestion and obstruction.  She has a mildly deviated septum, inferior turbinate hypertrophy, and nasal valve collapse.  She has dryness and irritation which may be from the nasal medications.  Cerumen impaction was removed from both ears  Plan -Findings and management options were discussed with the patient. -she may try using the nasal saline irrigations without the mometasone to see if she has less irritation.  If it does not help with the irritation, she may try stopping the other nasal spray -moisturizing nasal gel as needed.  -Breath right strips prn -she could consider nasal valve repair to improve her breathing. it would not help with the sinus pressure and pain -I again asked her to send me a copy of the most recent CT scan results. She may consider repeating the scan. she is going to hold off on repeating the scan at this time.  -continue treatment for TMJD. We had discussed PT and botox -continue treating migraines -continue treatment for reflux -she is going to follow up with her other doctor who is treating the nasal culture -i will speak with her about repeating her audiogram when she returns.  -follow up if not improving or any other concerns.

## 2024-08-20 NOTE — HISTORY OF PRESENT ILLNESS
[de-identified] : JAY MONTAGUE is a 73 year old patient With a history of chronic rhinitis, nasal valve collapse, migraines, and TMJ dysfunction here for follow-up.  She took the oral steroids which helped.  She did not have findings of a sinus infection on nasal endoscopy at her last visit. She is breathing better.  She was using nasal saline occasions with mometasone.  She was restarted the nasal spray given to her by her other doctor which had mupirocin, gentamicin, Cipro, and fluconazole.  She is not using it 3 times a day though.  She has had some nasal irritation.  She used the moisturizing nasal gel yesterday.  She did try the migraine medication but she does not think it is helping.  Breathe Right strips do help with the nasal congestion.  She has not send the results of her prior evaluations including the CT scan  ENT History  She has a history of mild sensorineural hearing loss with a mild asymmetry in the left ear.  She had an MRI in the past. No IAC masses. Sinuses reportedly clear, she had an MRI in December 2022  She has TMJ dysfunction. She uses retainers following Invisalign treatment  she has seen a neurologist in the past and had recent evaluation. She has medication for migraines  Allergy evaluation was reportedly negative  2024- she went to a center where [facility]. They did a nasal culture which showed coagulase-negative staph and Acinetobacter ursinsii. She was given a nasal spray with mupirocin, gentamicin, Cipro, and fluconazole  2023- She saw another ENT and had allergy evaluation as well as a CT scan of the sinuses. Neurology evaluation was recommended at that point.

## 2025-01-16 ENCOUNTER — NON-APPOINTMENT (OUTPATIENT)
Age: 75
End: 2025-01-16

## 2025-01-16 ENCOUNTER — APPOINTMENT (OUTPATIENT)
Dept: OTOLARYNGOLOGY | Facility: CLINIC | Age: 75
End: 2025-01-16
Payer: COMMERCIAL

## 2025-01-16 DIAGNOSIS — H93.13 TINNITUS, BILATERAL: ICD-10-CM

## 2025-01-16 DIAGNOSIS — H61.22 IMPACTED CERUMEN, LEFT EAR: ICD-10-CM

## 2025-01-16 DIAGNOSIS — M26.609 UNSPECIFIED TEMPOROMANDIBULAR JOINT DISORDER: ICD-10-CM

## 2025-01-16 PROCEDURE — 99213 OFFICE O/P EST LOW 20 MIN: CPT | Mod: 25

## 2025-08-13 ENCOUNTER — APPOINTMENT (OUTPATIENT)
Dept: OTOLARYNGOLOGY | Facility: CLINIC | Age: 75
End: 2025-08-13
Payer: COMMERCIAL

## 2025-08-13 DIAGNOSIS — J34.829 NASAL VALVE COLLAPSE, UNSPECIFIED: ICD-10-CM

## 2025-08-13 DIAGNOSIS — M26.609 UNSPECIFIED TEMPOROMANDIBULAR JOINT DISORDER: ICD-10-CM

## 2025-08-13 DIAGNOSIS — H61.23 IMPACTED CERUMEN, BILATERAL: ICD-10-CM

## 2025-08-13 DIAGNOSIS — J34.89 OTHER SPECIFIED DISORDERS OF NOSE AND NASAL SINUSES: ICD-10-CM

## 2025-08-13 DIAGNOSIS — J31.0 CHRONIC RHINITIS: ICD-10-CM

## 2025-08-13 DIAGNOSIS — R44.8 OTHER SYMPTOMS AND SIGNS INVOLVING GENERAL SENSATIONS AND PERCEPTIONS: ICD-10-CM

## 2025-08-13 DIAGNOSIS — K21.9 GASTRO-ESOPHAGEAL REFLUX DISEASE W/OUT ESOPHAGITIS: ICD-10-CM

## 2025-08-13 DIAGNOSIS — H90.3 SENSORINEURAL HEARING LOSS, BILATERAL: ICD-10-CM

## 2025-08-13 PROCEDURE — 99214 OFFICE O/P EST MOD 30 MIN: CPT | Mod: 25

## 2025-08-13 PROCEDURE — 31231 NASAL ENDOSCOPY DX: CPT

## 2025-08-13 RX ORDER — FAMOTIDINE 20 MG/1
20 TABLET, FILM COATED ORAL
Qty: 60 | Refills: 2 | Status: ACTIVE | COMMUNITY
Start: 2025-08-13 | End: 1900-01-01

## 2025-08-26 ENCOUNTER — APPOINTMENT (OUTPATIENT)
Dept: OTOLARYNGOLOGY | Facility: CLINIC | Age: 75
End: 2025-08-26
Payer: COMMERCIAL

## 2025-08-26 DIAGNOSIS — H90.3 SENSORINEURAL HEARING LOSS, BILATERAL: ICD-10-CM

## 2025-08-26 PROCEDURE — 92567 TYMPANOMETRY: CPT

## 2025-08-26 PROCEDURE — 92557 COMPREHENSIVE HEARING TEST: CPT

## 2025-09-09 ENCOUNTER — APPOINTMENT (OUTPATIENT)
Dept: PHYSICAL MEDICINE AND REHAB | Facility: CLINIC | Age: 75
End: 2025-09-09
Payer: COMMERCIAL

## 2025-09-09 VITALS
WEIGHT: 133 LBS | DIASTOLIC BLOOD PRESSURE: 68 MMHG | HEIGHT: 63.5 IN | SYSTOLIC BLOOD PRESSURE: 101 MMHG | HEART RATE: 68 BPM | BODY MASS INDEX: 23.27 KG/M2 | RESPIRATION RATE: 16 BRPM

## 2025-09-09 DIAGNOSIS — J34.89 OTHER SPECIFIED DISORDERS OF NOSE AND NASAL SINUSES: ICD-10-CM

## 2025-09-09 DIAGNOSIS — R51.9 HEADACHE, UNSPECIFIED: ICD-10-CM

## 2025-09-09 DIAGNOSIS — G43.719 CHRONIC MIGRAINE W/OUT AURA, INTRACTABLE, W/OUT STATUS MIGRAINOSUS: ICD-10-CM

## 2025-09-09 DIAGNOSIS — M26.609 UNSPECIFIED TEMPOROMANDIBULAR JOINT DISORDER: ICD-10-CM

## 2025-09-09 DIAGNOSIS — J31.0 CHRONIC RHINITIS: ICD-10-CM

## 2025-09-09 PROCEDURE — 99204 OFFICE O/P NEW MOD 45 MIN: CPT

## 2025-09-09 RX ORDER — ONABOTULINUMTOXINA 200 [USP'U]/1
200 INJECTION, POWDER, LYOPHILIZED, FOR SOLUTION INTRADERMAL; INTRAMUSCULAR
Qty: 200 | Refills: 0 | Status: ACTIVE | COMMUNITY
Start: 2025-09-09 | End: 1900-01-01